# Patient Record
Sex: FEMALE | Race: ASIAN | NOT HISPANIC OR LATINO | URBAN - METROPOLITAN AREA
[De-identification: names, ages, dates, MRNs, and addresses within clinical notes are randomized per-mention and may not be internally consistent; named-entity substitution may affect disease eponyms.]

---

## 2022-04-09 ENCOUNTER — EMERGENCY (EMERGENCY)
Facility: HOSPITAL | Age: 37
LOS: 1 days | Discharge: HOME | End: 2022-04-09
Attending: EMERGENCY MEDICINE
Payer: MEDICAID

## 2022-04-09 VITALS
OXYGEN SATURATION: 96 % | DIASTOLIC BLOOD PRESSURE: 60 MMHG | HEART RATE: 75 BPM | TEMPERATURE: 99 F | SYSTOLIC BLOOD PRESSURE: 100 MMHG | RESPIRATION RATE: 18 BRPM

## 2022-04-09 VITALS
SYSTOLIC BLOOD PRESSURE: 123 MMHG | HEART RATE: 85 BPM | HEIGHT: 65 IN | TEMPERATURE: 98 F | DIASTOLIC BLOOD PRESSURE: 81 MMHG | WEIGHT: 138.01 LBS | OXYGEN SATURATION: 98 % | RESPIRATION RATE: 20 BRPM

## 2022-04-09 PROCEDURE — 99283 EMERGENCY DEPT VISIT LOW MDM: CPT

## 2022-04-09 RX ORDER — FAMOTIDINE 10 MG/ML
20 INJECTION INTRAVENOUS ONCE
Refills: 0 | Status: COMPLETED | OUTPATIENT
Start: 2022-04-09 | End: 2022-04-09

## 2022-04-09 RX ADMIN — Medication 30 MILLILITER(S): at 18:41

## 2022-04-09 RX ADMIN — FAMOTIDINE 20 MILLIGRAM(S): 10 INJECTION INTRAVENOUS at 18:41

## 2022-04-09 NOTE — ED PROVIDER NOTE - ATTENDING CONTRIBUTION TO CARE
pt is a healthy 36 y/o female here with fb sensation to throat with a date, threw it up, no sob, difficulty with secretions, heent nl, maalox given for abrasion to soft tissue, doubt plain films will be helpful, possible ent follow up. feels better while in ED.

## 2022-04-09 NOTE — ED PROVIDER NOTE - PATIENT PORTAL LINK FT
You can access the FollowMyHealth Patient Portal offered by [facility] by registering at the following website: [email or web address]. By joining [employer]’s FollowMyHealth portal, you will also be able to view your health information using other applications (apps) compatible with our system.

## 2022-04-09 NOTE — ED PROVIDER NOTE - NSFOLLOWUPCLINICS_GEN_ALL_ED_FT
An ENT Doctor  Otolaryngology (ENT)  .  [place]   Phone:   Fax:     [facility] - ENT  Otolaryngology (ENT)  [address] [place] [address]  Phone: [phone]  Fax:

## 2022-04-09 NOTE — ED ADULT NURSE NOTE - OBJECTIVE STATEMENT
38 y/o F A&Ox3 denies PMH/PSH presents to the ED from home c/o food stuck in her throat. Pt reports eating a date this evening when she felt like the food became stuck in her throat. Pt states she drank apple cider vinegar to dry to dislodge the food. Pt reports shortly after drinking vinegar she threw up x2. Upon arrival to ED pt is well appearing. Breathing is even and unlabored. Skin is warm, dry & in tact. Pt is speaking in full coherent sentences, does not appear to be in any acute distress at this time. Denies CP, SOB, HA, diarrhea, fevers, chills. Comfort & safety provided.

## 2022-04-09 NOTE — ED PROVIDER NOTE - OBJECTIVE STATEMENT
38 y/o f no contributory hx here with FB sensation in upper throat, ate dried dates, coughed with trace bleeding, but continued to have sensation. While in WR had emesis, felt something move, now feels a bit better. Tolerating PO after, no fevers, no continued bleeding.

## 2022-04-09 NOTE — ED PROVIDER NOTE - CHILD ABUSE FACILITY
[facility] PAST SURGICAL HISTORY:  H/O lipoma removal    History of hysterectomy for cancer     History of trigger finger 1978

## 2022-04-09 NOTE — ED PROVIDER NOTE - NSFOLLOWUPINSTRUCTIONS_ED_ALL_ED_FT
Return to the ER immediately for difficulty breathing, profuse bleeding from the mouth, change in your voice, difficulty swallowing your saliva, fevers, inability to eat or drink.    Follow up with an ENT

## 2022-04-09 NOTE — ED PROVIDER NOTE - NS ED ROS FT
Constitutional: (-) fever (-) vomiting  Eyes/ENT: (-) vision changes, (-) hearing changes +FB sensation  Cardiovascular: (-) chest pain, (-) wheezing  Respiratory: (-) cough, (-) shortness of breath  Gastrointestinal: (-) vomiting, (-) diarrhea, (-) abdominal pain  : (-) dysuria   Musculoskeletal: (-) back pain  Integumentary: (-) rash, (-) edema  Neurological: (-)loc  Allergic/Immunologic: (-) pruritus  Endocrine: No history of thyroid disease

## 2022-04-09 NOTE — ED ADULT TRIAGE NOTE - CHIEF COMPLAINT QUOTE
Ate dried date fruit at 2pm - feels like date Is stuck in throat. Vomited in triage bathroom but still feels sensation of food in throat.

## 2022-04-09 NOTE — ED PROVIDER NOTE - PROGRESS NOTE DETAILS
Sree: pt tolerating liquid and solid PO without difficulty, given return precautions, ent f/u, verbalized understanding

## 2022-04-09 NOTE — ED PROVIDER NOTE - PHYSICAL EXAMINATION
Vitals: I have reviewed the patients vital signs  General: Well dressed, well appearing, no acute distress  HEENT: Atraumatic, normocephalic, airway patent, minimal posterior oropharyngeal erythema, tolerating secretions  Eyes: EOMI, tracking appropriately  Neck: no tracheal deviation, no JVD, no tenderness with anterior palpation, able to swallow without difficulty  Chest/Lungs: no trauma, symmetric chest rise, speaking in complete sentences, no WOB  Heart: skin and extremities well perfused, regular rate and rhythm  Neuro: A+Ox3, ambulating without difficulty, CN grossly intact  MSK: strength at baseline in all extremities, no muscle wasting or atrophy  Skin: no cyanosis, no jaundice, no new emergent lesions

## 2025-04-25 NOTE — ED ADULT NURSE NOTE - BRAND OF COVID-19 VACCINATION
Received fax from [facility], requesting last office visit notes and provider signature. Faxed to [phone].   Moderna dose 1 and 2